# Patient Record
Sex: MALE | Race: OTHER | HISPANIC OR LATINO
[De-identification: names, ages, dates, MRNs, and addresses within clinical notes are randomized per-mention and may not be internally consistent; named-entity substitution may affect disease eponyms.]

---

## 2017-11-07 ENCOUNTER — RX RENEWAL (OUTPATIENT)
Age: 27
End: 2017-11-07

## 2018-01-26 ENCOUNTER — APPOINTMENT (OUTPATIENT)
Dept: NEUROLOGY | Facility: CLINIC | Age: 28
End: 2018-01-26
Payer: COMMERCIAL

## 2018-01-26 VITALS
SYSTOLIC BLOOD PRESSURE: 142 MMHG | HEART RATE: 75 BPM | BODY MASS INDEX: 24.38 KG/M2 | WEIGHT: 190 LBS | HEIGHT: 74 IN | DIASTOLIC BLOOD PRESSURE: 88 MMHG

## 2018-01-26 PROCEDURE — 99214 OFFICE O/P EST MOD 30 MIN: CPT

## 2018-10-17 ENCOUNTER — RX RENEWAL (OUTPATIENT)
Age: 28
End: 2018-10-17

## 2019-01-25 ENCOUNTER — APPOINTMENT (OUTPATIENT)
Dept: NEUROLOGY | Facility: CLINIC | Age: 29
End: 2019-01-25
Payer: COMMERCIAL

## 2019-01-25 VITALS
WEIGHT: 183 LBS | HEIGHT: 74 IN | SYSTOLIC BLOOD PRESSURE: 122 MMHG | DIASTOLIC BLOOD PRESSURE: 75 MMHG | HEART RATE: 74 BPM | BODY MASS INDEX: 23.49 KG/M2

## 2019-01-25 PROCEDURE — 99214 OFFICE O/P EST MOD 30 MIN: CPT

## 2019-01-25 NOTE — DISCUSSION/SUMMARY
[FreeTextEntry1] : Impression - \par idiopathic generalized epilepsy\par \par Plan - Continue ZNS at 200mg bid\par - f/u 6 months\par - Talked about possibly lowering AEDs if Seizure free at least two years and would get AEEG first; if active would not lower.\par \par We discussed his seizure disorder in detail and his overall prognosis.  It was explained that IGE is very treatable and we reviewed the ARDs of ZNS in detail.  He was also told to stay well hydrated.\par \par The patient was seen for a total of 25 min face to face with >50% in discussion and counseling. [Well-controlled] : well-controlled [Generalized] : generalized  [Idiopathic] : idiopathic

## 2019-01-25 NOTE — PHYSICAL EXAM
[General Appearance - Alert] : alert [General Appearance - In No Acute Distress] : in no acute distress [Oriented To Time, Place, And Person] : oriented to person, place, and time [Impaired Insight] : insight and judgment were intact [Affect] : the affect was normal [Cranial Nerves Optic (II)] : visual acuity intact bilaterally,  visual fields full to confrontation, pupils equal round and reactive to light [Cranial Nerves Oculomotor (III)] : extraocular motion intact [Cranial Nerves Trigeminal (V)] : facial sensation intact symmetrically [Cranial Nerves Facial (VII)] : face symmetrical [Cranial Nerves Vestibulocochlear (VIII)] : hearing was intact bilaterally [Cranial Nerves Glossopharyngeal (IX)] : tongue and palate midline [Cranial Nerves Accessory (XI - Cranial And Spinal)] : head turning and shoulder shrug symmetric [Cranial Nerves Hypoglossal (XII)] : there was no tongue deviation with protrusion [Motor Strength] : muscle strength was normal in all four extremities [No Muscle Atrophy] : normal bulk in all four extremities [Sensation Tactile Decrease] : light touch was intact [Sensation Pain / Temperature Decrease] : pain and temperature was intact [Sensation Vibration Decrease] : vibration was intact [Proprioception] : proprioception was intact [Balance] : balance was intact [Past-pointing] : there was no past-pointing [Tremor] : no tremor present [2+] : Ankle jerk left 2+ [Plantar Reflex Right Only] : normal on the right [Plantar Reflex Left Only] : normal on the left [Sclera] : the sclera and conjunctiva were normal [PERRL With Normal Accommodation] : pupils were equal in size, round, reactive to light, with normal accommodation [Extraocular Movements] : extraocular movements were intact [Full Pulse] : the pedal pulses are present [Edema] : there was no peripheral edema [Abnormal Walk] : normal gait [Nail Clubbing] : no clubbing  or cyanosis of the fingernails [Musculoskeletal - Swelling] : no joint swelling seen [Motor Tone] : muscle strength and tone were normal [Skin Color & Pigmentation] : normal skin color and pigmentation [Skin Turgor] : normal skin turgor [] : no rash

## 2019-01-25 NOTE — HISTORY OF PRESENT ILLNESS
[Right Handed] : right handed [Sleep Deprivation] : sleep deprivation [Postictal Confusion and Lethargy] : postictal confusion and lethargy [Grand Mal Status Epilepticus] : no [] : yes [Family History of Seizures] : family history of seizures [ Complications] : ~T no  complications [Head Trauma] : no head trauma [Febrile Seizures] : no febrile seizures [Meningitis or Encephalitis] : no meningitis or encephalitis [Developmental Delay] : no developmental delay [Stroke] : no stroke  [Oxcarbazepine (Trileptal)] : oxcarbazepine (Trileptal) [Zonisamide (Zonegran)] : zonisamide (Zonegran) [FreeTextEntry1] : 12 years of age [FreeTextEntry2] : GTC [FreeTextEntry8] : lack of food [de-identified] : Jan 2014 [de-identified] : bumps and bruises [de-identified] : mother - in teenage years to 20's (not on any meds now)

## 2019-07-30 ENCOUNTER — APPOINTMENT (OUTPATIENT)
Dept: NEUROLOGY | Facility: CLINIC | Age: 29
End: 2019-07-30
Payer: COMMERCIAL

## 2019-07-30 VITALS
BODY MASS INDEX: 24.26 KG/M2 | DIASTOLIC BLOOD PRESSURE: 76 MMHG | WEIGHT: 189 LBS | HEART RATE: 70 BPM | SYSTOLIC BLOOD PRESSURE: 129 MMHG | HEIGHT: 74 IN

## 2019-07-30 PROCEDURE — 99214 OFFICE O/P EST MOD 30 MIN: CPT

## 2019-07-30 NOTE — DISCUSSION/SUMMARY
[FreeTextEntry1] : Impression - \par idiopathic generalized epilepsy\par \par Plan - Continue ZNS at 200mg bid\par - f/u 6 months\par - Talked about possibly lowering AEDs if Seizure free at least two years and would get AEEG first; if active would not lower.\par \par We discussed his seizure disorder in detail and his overall prognosis.  It was explained that IGE is very treatable and we reviewed the ARDs of ZNS in detail.  He was also told to stay well hydrated.\par \par The patient was seen for a total of 25 min face to face with >50% in discussion and counseling. [Generalized] : generalized  [Idiopathic] : idiopathic  [Well-controlled] : well-controlled

## 2019-07-30 NOTE — PHYSICAL EXAM
[General Appearance - Alert] : alert [General Appearance - In No Acute Distress] : in no acute distress [Affect] : the affect was normal [Impaired Insight] : insight and judgment were intact [Oriented To Time, Place, And Person] : oriented to person, place, and time [Cranial Nerves Trigeminal (V)] : facial sensation intact symmetrically [Cranial Nerves Facial (VII)] : face symmetrical [Cranial Nerves Oculomotor (III)] : extraocular motion intact [Cranial Nerves Optic (II)] : visual acuity intact bilaterally,  visual fields full to confrontation, pupils equal round and reactive to light [Cranial Nerves Glossopharyngeal (IX)] : tongue and palate midline [Cranial Nerves Vestibulocochlear (VIII)] : hearing was intact bilaterally [Cranial Nerves Accessory (XI - Cranial And Spinal)] : head turning and shoulder shrug symmetric [Cranial Nerves Hypoglossal (XII)] : there was no tongue deviation with protrusion [Motor Strength] : muscle strength was normal in all four extremities [No Muscle Atrophy] : normal bulk in all four extremities [Sensation Tactile Decrease] : light touch was intact [Proprioception] : proprioception was intact [Sensation Vibration Decrease] : vibration was intact [Sensation Pain / Temperature Decrease] : pain and temperature was intact [Balance] : balance was intact [Past-pointing] : there was no past-pointing [Tremor] : no tremor present [2+] : Ankle jerk left 2+ [Plantar Reflex Right Only] : normal on the right [Plantar Reflex Left Only] : normal on the left [Sclera] : the sclera and conjunctiva were normal [Extraocular Movements] : extraocular movements were intact [Full Pulse] : the pedal pulses are present [PERRL With Normal Accommodation] : pupils were equal in size, round, reactive to light, with normal accommodation [Abnormal Walk] : normal gait [Edema] : there was no peripheral edema [Nail Clubbing] : no clubbing  or cyanosis of the fingernails [Motor Tone] : muscle strength and tone were normal [Skin Color & Pigmentation] : normal skin color and pigmentation [Musculoskeletal - Swelling] : no joint swelling seen [] : no rash [Skin Turgor] : normal skin turgor

## 2019-07-30 NOTE — HISTORY OF PRESENT ILLNESS
[Right Handed] : right handed [Postictal Confusion and Lethargy] : postictal confusion and lethargy [Sleep Deprivation] : sleep deprivation [Grand Mal Status Epilepticus] : no [Family History of Seizures] : family history of seizures [] : yes [ Complications] : ~T no  complications [Head Trauma] : no head trauma [Meningitis or Encephalitis] : no meningitis or encephalitis [Febrile Seizures] : no febrile seizures [Developmental Delay] : no developmental delay [Oxcarbazepine (Trileptal)] : oxcarbazepine (Trileptal) [Stroke] : no stroke  [FreeTextEntry2] : GTC [FreeTextEntry1] : 12 years of age [Zonisamide (Zonegran)] : zonisamide (Zonegran) [FreeTextEntry8] : lack of food [de-identified] : bumps and bruises [de-identified] : Jan 2014 [de-identified] : mother - in teenage years to 20's (not on any meds now)

## 2019-07-30 NOTE — REVIEW OF SYSTEMS
How Severe Is Your Skin Lesion?: mild
Has Your Skin Lesion Been Treated?: not been treated
Is This A New Presentation, Or A Follow-Up?: Skin Lesion
[Negative] : Heme/Lymph

## 2019-09-10 ENCOUNTER — RX RENEWAL (OUTPATIENT)
Age: 29
End: 2019-09-10

## 2019-10-18 ENCOUNTER — APPOINTMENT (OUTPATIENT)
Dept: NEUROLOGY | Facility: CLINIC | Age: 29
End: 2019-10-18
Payer: COMMERCIAL

## 2019-10-18 PROCEDURE — 95816 EEG AWAKE AND DROWSY: CPT

## 2019-10-19 PROCEDURE — 95953: CPT

## 2019-10-20 PROCEDURE — 95953: CPT

## 2020-01-28 ENCOUNTER — APPOINTMENT (OUTPATIENT)
Dept: NEUROLOGY | Facility: CLINIC | Age: 30
End: 2020-01-28
Payer: COMMERCIAL

## 2020-01-28 VITALS
WEIGHT: 187 LBS | DIASTOLIC BLOOD PRESSURE: 64 MMHG | HEIGHT: 73 IN | SYSTOLIC BLOOD PRESSURE: 107 MMHG | HEART RATE: 79 BPM | BODY MASS INDEX: 24.78 KG/M2

## 2020-01-28 PROCEDURE — 99214 OFFICE O/P EST MOD 30 MIN: CPT

## 2020-01-28 NOTE — DISCUSSION/SUMMARY
[Well-controlled] : well-controlled [FreeTextEntry1] : Impression - \par idiopathic generalized epilepsy\par \par Plan - Continue ZNS at 200mg bid\par - f/u 6 months\par - will check level today of ZNS\par - Talked about possibly lowering AEDs \par \par We discussed his seizure disorder in detail and his overall prognosis.  It was explained that IGE is very treatable and we reviewed the ARDs of ZNS in detail.  He was also told to stay well hydrated.\par Had AEEG done in 2019 with gen spike and wave and no seizure activity.  Told him that should not lower off AEDs.  However, patient would like to lower to 100/200 of ZNS.  We will check level today and it on the lower side, would not lower.  If a bit higher, we could lower ZNS but patient understands risk of seizure and that should not drive while lowering or for several months after.\par \par The patient was seen for a total of 25 min face to face with >50% in discussion and counseling. [Generalized] : generalized  [Idiopathic] : idiopathic

## 2020-01-28 NOTE — HISTORY OF PRESENT ILLNESS
[Right Handed] : right handed [Sleep Deprivation] : sleep deprivation [Postictal Confusion and Lethargy] : postictal confusion and lethargy [Grand Mal Status Epilepticus] : no [] : yes [ Complications] : ~T no  complications [Family History of Seizures] : family history of seizures [Head Trauma] : no head trauma [Febrile Seizures] : no febrile seizures [Meningitis or Encephalitis] : no meningitis or encephalitis [Developmental Delay] : no developmental delay [Oxcarbazepine (Trileptal)] : oxcarbazepine (Trileptal) [Stroke] : no stroke  [Zonisamide (Zonegran)] : zonisamide (Zonegran) [FreeTextEntry1] : 12 years of age [FreeTextEntry2] : GTC [de-identified] : bumps and bruises [de-identified] : Jan 2014 [FreeTextEntry8] : lack of food [de-identified] : mother - in teenage years to 20's (not on any meds now)

## 2020-01-28 NOTE — PHYSICAL EXAM
[General Appearance - Alert] : alert [General Appearance - In No Acute Distress] : in no acute distress [Oriented To Time, Place, And Person] : oriented to person, place, and time [Impaired Insight] : insight and judgment were intact [Affect] : the affect was normal [Cranial Nerves Optic (II)] : visual acuity intact bilaterally,  visual fields full to confrontation, pupils equal round and reactive to light [Cranial Nerves Oculomotor (III)] : extraocular motion intact [Cranial Nerves Facial (VII)] : face symmetrical [Cranial Nerves Vestibulocochlear (VIII)] : hearing was intact bilaterally [Cranial Nerves Trigeminal (V)] : facial sensation intact symmetrically [Cranial Nerves Accessory (XI - Cranial And Spinal)] : head turning and shoulder shrug symmetric [Cranial Nerves Glossopharyngeal (IX)] : tongue and palate midline [Cranial Nerves Hypoglossal (XII)] : there was no tongue deviation with protrusion [Motor Strength] : muscle strength was normal in all four extremities [No Muscle Atrophy] : normal bulk in all four extremities [Sensation Pain / Temperature Decrease] : pain and temperature was intact [Sensation Tactile Decrease] : light touch was intact [Sensation Vibration Decrease] : vibration was intact [Proprioception] : proprioception was intact [Tremor] : no tremor present [Balance] : balance was intact [Past-pointing] : there was no past-pointing [Plantar Reflex Right Only] : normal on the right [2+] : Ankle jerk right 2+ [Plantar Reflex Left Only] : normal on the left [Sclera] : the sclera and conjunctiva were normal [PERRL With Normal Accommodation] : pupils were equal in size, round, reactive to light, with normal accommodation [Extraocular Movements] : extraocular movements were intact [Full Pulse] : the pedal pulses are present [Edema] : there was no peripheral edema [Nail Clubbing] : no clubbing  or cyanosis of the fingernails [Abnormal Walk] : normal gait [Motor Tone] : muscle strength and tone were normal [Musculoskeletal - Swelling] : no joint swelling seen [Skin Color & Pigmentation] : normal skin color and pigmentation [Skin Turgor] : normal skin turgor [] : no rash

## 2020-07-21 ENCOUNTER — APPOINTMENT (OUTPATIENT)
Dept: NEUROLOGY | Facility: CLINIC | Age: 30
End: 2020-07-21
Payer: COMMERCIAL

## 2020-07-21 ENCOUNTER — TRANSCRIPTION ENCOUNTER (OUTPATIENT)
Age: 30
End: 2020-07-21

## 2020-07-21 PROCEDURE — 99213 OFFICE O/P EST LOW 20 MIN: CPT | Mod: 95

## 2020-07-21 NOTE — HISTORY OF PRESENT ILLNESS
[Right Handed] : right handed [Sleep Deprivation] : sleep deprivation [] : yes [Postictal Confusion and Lethargy] : postictal confusion and lethargy [Family History of Seizures] : family history of seizures [Oxcarbazepine (Trileptal)] : oxcarbazepine (Trileptal) [Zonisamide (Zonegran)] : zonisamide (Zonegran) [Home] : at home, [unfilled] , at the time of the visit. [Other Location: e.g. Home (Enter Location, City,State)___] : at [unfilled] [Verbal consent obtained from patient] : the patient, [unfilled] [Grand Mal Status Epilepticus] : no [ Complications] : ~T no  complications [Head Trauma] : no head trauma [Febrile Seizures] : no febrile seizures [Meningitis or Encephalitis] : no meningitis or encephalitis [Developmental Delay] : no developmental delay [Stroke] : no stroke  [FreeTextEntry1] : 12 years of age [FreeTextEntry2] : GTC [de-identified] : Jan 2014 [de-identified] : bumps and bruises [de-identified] : mother - in teenage years to 20's (not on any meds now) [FreeTextEntry8] : lack of food

## 2020-07-21 NOTE — PHYSICAL EXAM
[General Appearance - Alert] : alert [Oriented To Time, Place, And Person] : oriented to person, place, and time [General Appearance - In No Acute Distress] : in no acute distress [Impaired Insight] : insight and judgment were intact [Cranial Nerves Optic (II)] : visual acuity intact bilaterally,  visual fields full to confrontation, pupils equal round and reactive to light [Affect] : the affect was normal [Cranial Nerves Trigeminal (V)] : facial sensation intact symmetrically [Cranial Nerves Oculomotor (III)] : extraocular motion intact [Cranial Nerves Facial (VII)] : face symmetrical [Cranial Nerves Glossopharyngeal (IX)] : tongue and palate midline [Cranial Nerves Vestibulocochlear (VIII)] : hearing was intact bilaterally [Cranial Nerves Accessory (XI - Cranial And Spinal)] : head turning and shoulder shrug symmetric [Cranial Nerves Hypoglossal (XII)] : there was no tongue deviation with protrusion [No Muscle Atrophy] : normal bulk in all four extremities [Motor Strength] : muscle strength was normal in all four extremities [Sensation Tactile Decrease] : light touch was intact [Sensation Pain / Temperature Decrease] : pain and temperature was intact [Sensation Vibration Decrease] : vibration was intact [Balance] : balance was intact [Proprioception] : proprioception was intact [2+] : Ankle jerk right 2+ [PERRL With Normal Accommodation] : pupils were equal in size, round, reactive to light, with normal accommodation [Sclera] : the sclera and conjunctiva were normal [Extraocular Movements] : extraocular movements were intact [Nail Clubbing] : no clubbing  or cyanosis of the fingernails [Abnormal Walk] : normal gait [Skin Color & Pigmentation] : normal skin color and pigmentation [Motor Tone] : muscle strength and tone were normal [Musculoskeletal - Swelling] : no joint swelling seen [Skin Turgor] : normal skin turgor [] : no rash [Past-pointing] : there was no past-pointing [Tremor] : no tremor present [Plantar Reflex Left Only] : normal on the left [Plantar Reflex Right Only] : normal on the right

## 2020-07-21 NOTE — DISCUSSION/SUMMARY
[Well-controlled] : well-controlled [Idiopathic] : idiopathic  [Generalized] : generalized  [FreeTextEntry1] : Impression - \par idiopathic generalized epilepsy\par \par Plan - Continue ZNS at 200mg bid\par - f/u 6 months\par - will check level next visit\par - Talked about possibly lowering AEDs \par \par We discussed his seizure disorder in detail and his overall prognosis.  It was explained that IGE is very treatable and we reviewed the ARDs of ZNS in detail.  He was also told to stay well hydrated.\par Had AEEG done in 2019 with gen spike and wave and no seizure activity.  Told him that should not lower off AEDs. \par \par We spoke at last visit that he would like to lower to 100/200 of ZNS.  At this point, he does not want to do so.\par \par The patient was seen for a total of 15 min face to face with >50% in discussion and counseling.

## 2021-03-19 ENCOUNTER — APPOINTMENT (OUTPATIENT)
Dept: NEUROLOGY | Facility: CLINIC | Age: 31
End: 2021-03-19
Payer: COMMERCIAL

## 2021-03-19 VITALS — TEMPERATURE: 97.3 F

## 2021-03-19 VITALS
BODY MASS INDEX: 24.92 KG/M2 | WEIGHT: 188 LBS | SYSTOLIC BLOOD PRESSURE: 135 MMHG | HEART RATE: 84 BPM | HEIGHT: 73 IN | DIASTOLIC BLOOD PRESSURE: 85 MMHG

## 2021-03-19 PROCEDURE — 99214 OFFICE O/P EST MOD 30 MIN: CPT

## 2021-03-19 PROCEDURE — 99072 ADDL SUPL MATRL&STAF TM PHE: CPT

## 2021-03-19 NOTE — HISTORY OF PRESENT ILLNESS
[Right Handed] : right handed [Sleep Deprivation] : sleep deprivation [Postictal Confusion and Lethargy] : postictal confusion and lethargy [Grand Mal Status Epilepticus] : no [] : yes [Family History of Seizures] : family history of seizures [ Complications] : ~T no  complications [Head Trauma] : no head trauma [Febrile Seizures] : no febrile seizures [Meningitis or Encephalitis] : no meningitis or encephalitis [Developmental Delay] : no developmental delay [Stroke] : no stroke  [Oxcarbazepine (Trileptal)] : oxcarbazepine (Trileptal) [Zonisamide (Zonegran)] : zonisamide (Zonegran) [FreeTextEntry1] : 12 years of age [FreeTextEntry2] : GTC [FreeTextEntry8] : lack of food [de-identified] : Jan 2014 [de-identified] : bumps and bruises [de-identified] : mother - in teenage years to 20's (not on any meds now)

## 2021-03-19 NOTE — PHYSICAL EXAM
[General Appearance - Alert] : alert [General Appearance - In No Acute Distress] : in no acute distress [Oriented To Time, Place, And Person] : oriented to person, place, and time [Impaired Insight] : insight and judgment were intact [Affect] : the affect was normal [Cranial Nerves Optic (II)] : visual acuity intact bilaterally,  visual fields full to confrontation, pupils equal round and reactive to light [Cranial Nerves Oculomotor (III)] : extraocular motion intact [Cranial Nerves Trigeminal (V)] : facial sensation intact symmetrically [Cranial Nerves Facial (VII)] : face symmetrical [Cranial Nerves Vestibulocochlear (VIII)] : hearing was intact bilaterally [Cranial Nerves Glossopharyngeal (IX)] : tongue and palate midline [Cranial Nerves Accessory (XI - Cranial And Spinal)] : head turning and shoulder shrug symmetric [Cranial Nerves Hypoglossal (XII)] : there was no tongue deviation with protrusion [Motor Strength] : muscle strength was normal in all four extremities [No Muscle Atrophy] : normal bulk in all four extremities [Sensation Tactile Decrease] : light touch was intact [Sensation Pain / Temperature Decrease] : pain and temperature was intact [Sensation Vibration Decrease] : vibration was intact [Proprioception] : proprioception was intact [Balance] : balance was intact [Past-pointing] : there was no past-pointing [Tremor] : no tremor present [2+] : Ankle jerk left 2+ [Plantar Reflex Right Only] : normal on the right [Plantar Reflex Left Only] : normal on the left [Sclera] : the sclera and conjunctiva were normal [PERRL With Normal Accommodation] : pupils were equal in size, round, reactive to light, with normal accommodation [Extraocular Movements] : extraocular movements were intact [Abnormal Walk] : normal gait [Nail Clubbing] : no clubbing  or cyanosis of the fingernails [Musculoskeletal - Swelling] : no joint swelling seen [Motor Tone] : muscle strength and tone were normal [Skin Color & Pigmentation] : normal skin color and pigmentation [Skin Turgor] : normal skin turgor [] : no rash

## 2021-03-19 NOTE — DISCUSSION/SUMMARY
[FreeTextEntry1] : Impression - \par idiopathic generalized epilepsy\par \par Plan - Continue ZNS at 200mg bid\par - f/u 6 months\par - will check level\par - Talked about possibly lowering AEDs \par - Mentioned possible stones in urine, importance of hydration discussed again today.  He mentions that has no pain now or evidence of stones.  Told him if returns in any way to see urology.  If does have stones, would continue talk of lowering ZNS.\par \par We discussed his seizure disorder in detail and his overall prognosis.  It was explained that IGE is very treatable and we reviewed the ARDs of ZNS in detail.  He was also told to stay well hydrated.\par Had AEEG done in 2019 with gen spike and wave and no seizure activity.  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 [Well-controlled] : well-controlled [Generalized] : generalized  [Idiopathic] : idiopathic

## 2021-12-23 ENCOUNTER — NON-APPOINTMENT (OUTPATIENT)
Age: 31
End: 2021-12-23

## 2022-01-28 ENCOUNTER — APPOINTMENT (OUTPATIENT)
Dept: NEUROLOGY | Facility: CLINIC | Age: 32
End: 2022-01-28
Payer: COMMERCIAL

## 2022-01-28 VITALS
DIASTOLIC BLOOD PRESSURE: 83 MMHG | BODY MASS INDEX: 25.84 KG/M2 | OXYGEN SATURATION: 98 % | TEMPERATURE: 97.9 F | WEIGHT: 195 LBS | SYSTOLIC BLOOD PRESSURE: 144 MMHG | HEART RATE: 88 BPM | HEIGHT: 73 IN

## 2022-01-28 PROCEDURE — 99214 OFFICE O/P EST MOD 30 MIN: CPT

## 2022-01-28 NOTE — DISCUSSION/SUMMARY
[FreeTextEntry1] : Impression - \par idiopathic generalized epilepsy\par \par Plan - Continue ZNS at 200mg bid\par - f/u 6 months\par - will check level today, cbc, cmp\par - Talked about possibly lowering AEDs - no change at this time \par - Mentioned possible stones in urine in past, importance of hydration discussed again today.  He mentions that has no pain now or evidence of stones.  Told him if returns in any way to see urology.  If does have stones, would continue talk of lowering ZNS.\par \par We discussed his seizure disorder in detail and his overall prognosis.  It was explained that IGE is very treatable and we reviewed the ARDs of ZNS in detail.  He was also told to stay well hydrated.\par Had AEEG done in 2019 with gen spike and wave and no seizure activity.  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 [Well-controlled] : well-controlled [Generalized] : generalized  [Idiopathic] : idiopathic

## 2022-01-28 NOTE — HISTORY OF PRESENT ILLNESS
[Right Handed] : right handed [Sleep Deprivation] : sleep deprivation [Postictal Confusion and Lethargy] : postictal confusion and lethargy [Grand Mal Status Epilepticus] : no [] : yes [Family History of Seizures] : family history of seizures [ Complications] : ~T no  complications [Head Trauma] : no head trauma [Febrile Seizures] : no febrile seizures [Meningitis or Encephalitis] : no meningitis or encephalitis [Developmental Delay] : no developmental delay [Stroke] : no stroke  [Oxcarbazepine (Trileptal)] : oxcarbazepine (Trileptal) [Zonisamide (Zonegran)] : zonisamide (Zonegran) [FreeTextEntry1] : 12 years of age [FreeTextEntry2] : GTC [FreeTextEntry8] : lack of food [de-identified] : Jan 2014 [de-identified] : bumps and bruises [de-identified] : mother - in teenage years to 20's (not on any meds now)

## 2022-08-08 ENCOUNTER — APPOINTMENT (OUTPATIENT)
Dept: NEUROLOGY | Facility: CLINIC | Age: 32
End: 2022-08-08

## 2022-08-16 ENCOUNTER — APPOINTMENT (OUTPATIENT)
Dept: NEUROLOGY | Facility: CLINIC | Age: 32
End: 2022-08-16

## 2022-11-18 ENCOUNTER — APPOINTMENT (OUTPATIENT)
Dept: NEUROLOGY | Facility: CLINIC | Age: 32
End: 2022-11-18

## 2022-11-18 VITALS
RESPIRATION RATE: 16 BRPM | HEIGHT: 73 IN | WEIGHT: 195 LBS | BODY MASS INDEX: 25.84 KG/M2 | DIASTOLIC BLOOD PRESSURE: 98 MMHG | SYSTOLIC BLOOD PRESSURE: 152 MMHG | HEART RATE: 71 BPM

## 2022-11-18 PROCEDURE — 99215 OFFICE O/P EST HI 40 MIN: CPT

## 2022-11-18 NOTE — DISCUSSION/SUMMARY
[FreeTextEntry1] : Impression - \par idiopathic generalized epilepsy\par \par Plan - Continue ZNS at 200mg bid\par - Check ZNS level - long discussion about possibly increasing at this time but he would prefer to make no changes as of yet. If level on lower side, would recommend 300QHS and 200 in the morning.\par - f/u 6 months\par - will check level today, cbc, cmp\par - Mentioned possible stones in urine in past, importance of hydration discussed again today.  He mentions that has no pain now or evidence of stones.  Told him if returns in any way to see urology.  If does have stones, would continue talk of lowering ZNS or changing meds.\par \par We discussed his seizure disorder in detail and his overall prognosis.  It was explained that IGE is very treatable and we reviewed the ARDs of ZNS in detail.  He was also told to stay well hydrated.\par Had AEEG done in 2019 with gen spike and wave and no seizure activity.  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 Long discussion on driving; told him no driving for one year seizure free (start Nov 2023 at this time). Legal, health, safety and financial risks discussed.\par \par Total time 42 minutes. [Well-controlled] : well-controlled [Generalized] : generalized  [Idiopathic] : idiopathic

## 2022-11-18 NOTE — HISTORY OF PRESENT ILLNESS
[Right Handed] : right handed [Sleep Deprivation] : sleep deprivation [Postictal Confusion and Lethargy] : postictal confusion and lethargy [Grand Mal Status Epilepticus] : no [] : yes [Family History of Seizures] : family history of seizures [ Complications] : ~T no  complications [Head Trauma] : no head trauma [Febrile Seizures] : no febrile seizures [Meningitis or Encephalitis] : no meningitis or encephalitis [Developmental Delay] : no developmental delay [Stroke] : no stroke  [Oxcarbazepine (Trileptal)] : oxcarbazepine (Trileptal) [Zonisamide (Zonegran)] : zonisamide (Zonegran) [FreeTextEntry1] : 12 years of age [FreeTextEntry2] : GTC [FreeTextEntry8] : lack of food [de-identified] : Jan 2014 [de-identified] : bumps and bruises [de-identified] : mother - in teenage years to 20's (not on any meds now)

## 2022-12-03 LAB
ALBUMIN SERPL ELPH-MCNC: 4.6 G/DL
ALP BLD-CCNC: 86 U/L
ALT SERPL-CCNC: 15 U/L
ANION GAP SERPL CALC-SCNC: 12 MMOL/L
AST SERPL-CCNC: 14 U/L
BASOPHILS # BLD AUTO: 0.04 K/UL
BASOPHILS NFR BLD AUTO: 0.7 %
BILIRUB SERPL-MCNC: 0.5 MG/DL
BUN SERPL-MCNC: 16 MG/DL
CALCIUM SERPL-MCNC: 9.6 MG/DL
CHLORIDE SERPL-SCNC: 105 MMOL/L
CO2 SERPL-SCNC: 25 MMOL/L
CREAT SERPL-MCNC: 1.22 MG/DL
EGFR: 81 ML/MIN/1.73M2
EOSINOPHIL # BLD AUTO: 0.23 K/UL
EOSINOPHIL NFR BLD AUTO: 4.2 %
GLUCOSE SERPL-MCNC: 92 MG/DL
HCT VFR BLD CALC: 45 %
HGB BLD-MCNC: 15.1 G/DL
IMM GRANULOCYTES NFR BLD AUTO: 0.4 %
LYMPHOCYTES # BLD AUTO: 1.47 K/UL
LYMPHOCYTES NFR BLD AUTO: 26.7 %
MAN DIFF?: NORMAL
MCHC RBC-ENTMCNC: 31.9 PG
MCHC RBC-ENTMCNC: 33.6 GM/DL
MCV RBC AUTO: 94.9 FL
MONOCYTES # BLD AUTO: 0.42 K/UL
MONOCYTES NFR BLD AUTO: 7.6 %
NEUTROPHILS # BLD AUTO: 3.32 K/UL
NEUTROPHILS NFR BLD AUTO: 60.4 %
PLATELET # BLD AUTO: 240 K/UL
POTASSIUM SERPL-SCNC: 4.1 MMOL/L
PROT SERPL-MCNC: 6.9 G/DL
RBC # BLD: 4.74 M/UL
RBC # FLD: 13.2 %
SODIUM SERPL-SCNC: 142 MMOL/L
WBC # FLD AUTO: 5.5 K/UL

## 2022-12-06 LAB — ZONISAMIDE SERPL-MCNC: 20.2 UG/ML

## 2023-05-05 ENCOUNTER — APPOINTMENT (OUTPATIENT)
Dept: NEUROLOGY | Facility: CLINIC | Age: 33
End: 2023-05-05
Payer: COMMERCIAL

## 2023-05-05 VITALS
DIASTOLIC BLOOD PRESSURE: 91 MMHG | WEIGHT: 196 LBS | SYSTOLIC BLOOD PRESSURE: 138 MMHG | HEIGHT: 73 IN | BODY MASS INDEX: 25.98 KG/M2 | HEART RATE: 68 BPM

## 2023-05-05 PROCEDURE — 99214 OFFICE O/P EST MOD 30 MIN: CPT

## 2023-05-05 NOTE — HISTORY OF PRESENT ILLNESS
[Right Handed] : right handed [Sleep Deprivation] : sleep deprivation [Postictal Confusion and Lethargy] : postictal confusion and lethargy [] : yes [Family History of Seizures] : family history of seizures [Oxcarbazepine (Trileptal)] : oxcarbazepine (Trileptal) [Zonisamide (Zonegran)] : zonisamide (Zonegran) [Grand Mal Status Epilepticus] : no [ Complications] : ~T no  complications [Head Trauma] : no head trauma [Febrile Seizures] : no febrile seizures [Meningitis or Encephalitis] : no meningitis or encephalitis [Developmental Delay] : no developmental delay [Stroke] : no stroke  [FreeTextEntry1] : 12 years of age [FreeTextEntry2] : GTC [FreeTextEntry8] : lack of food [de-identified] : Jan 2014 [de-identified] : bumps and bruises [de-identified] : mother - in teenage years to 20's (not on any meds now)

## 2023-05-05 NOTE — DISCUSSION/SUMMARY
[Well-controlled] : well-controlled [Generalized] : generalized  [Idiopathic] : idiopathic  [FreeTextEntry1] : Impression - \par idiopathic generalized epilepsy\par \par Plan - Continue ZNS at 200mg bid\par - Checked ZNS level 20.2 (10-40) end of 2022 with normal CBC/CMP. At last visit, long discussion about possibly increasing at this time but he would prefer to make no changes\par - f/u 6 months\par - Mentioned possible stones in urine in past, importance of hydration discussed again today.  He mentions that has no pain now or evidence of stones.  Told him if returns in any way to see urology.  If does have stones, would continue talk of lowering ZNS or changing meds.\par \par We discussed his seizure disorder in detail and his overall prognosis.  It was explained that IGE is very treatable and we reviewed the ARDs of ZNS in detail.  He was also told to stay well hydrated.\par Had AEEG done in 2019 with gen spike and wave and no seizure activity.  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 Long discussion on driving; told him no driving for one year seizure free (start Nov 2023 at this time). Legal, health, safety and financial risks discussed.\par \par Total time 32 minutes.

## 2023-05-05 NOTE — PHYSICAL EXAM
[General Appearance - Alert] : alert [General Appearance - In No Acute Distress] : in no acute distress [Oriented To Time, Place, And Person] : oriented to person, place, and time [Impaired Insight] : insight and judgment were intact [Affect] : the affect was normal [Cranial Nerves Optic (II)] : visual acuity intact bilaterally,  visual fields full to confrontation, pupils equal round and reactive to light [Cranial Nerves Oculomotor (III)] : extraocular motion intact [Cranial Nerves Trigeminal (V)] : facial sensation intact symmetrically [Cranial Nerves Facial (VII)] : face symmetrical [Cranial Nerves Vestibulocochlear (VIII)] : hearing was intact bilaterally [Cranial Nerves Glossopharyngeal (IX)] : tongue and palate midline [Cranial Nerves Accessory (XI - Cranial And Spinal)] : head turning and shoulder shrug symmetric [Cranial Nerves Hypoglossal (XII)] : there was no tongue deviation with protrusion [Motor Strength] : muscle strength was normal in all four extremities [No Muscle Atrophy] : normal bulk in all four extremities [Sensation Tactile Decrease] : light touch was intact [Sensation Pain / Temperature Decrease] : pain and temperature was intact [Sensation Vibration Decrease] : vibration was intact [Proprioception] : proprioception was intact [Balance] : balance was intact [2+] : Ankle jerk left 2+ [Sclera] : the sclera and conjunctiva were normal [PERRL With Normal Accommodation] : pupils were equal in size, round, reactive to light, with normal accommodation [Extraocular Movements] : extraocular movements were intact [Abnormal Walk] : normal gait [Nail Clubbing] : no clubbing  or cyanosis of the fingernails [Musculoskeletal - Swelling] : no joint swelling seen [Motor Tone] : muscle strength and tone were normal [Skin Color & Pigmentation] : normal skin color and pigmentation [Skin Turgor] : normal skin turgor [] : no rash [Past-pointing] : there was no past-pointing [Tremor] : no tremor present [Plantar Reflex Right Only] : normal on the right [Plantar Reflex Left Only] : normal on the left

## 2023-10-10 ENCOUNTER — APPOINTMENT (OUTPATIENT)
Dept: NEUROLOGY | Facility: CLINIC | Age: 33
End: 2023-10-10

## 2023-11-13 ENCOUNTER — APPOINTMENT (OUTPATIENT)
Dept: NEUROLOGY | Facility: CLINIC | Age: 33
End: 2023-11-13
Payer: COMMERCIAL

## 2023-11-13 VITALS
HEART RATE: 83 BPM | DIASTOLIC BLOOD PRESSURE: 88 MMHG | BODY MASS INDEX: 25.98 KG/M2 | SYSTOLIC BLOOD PRESSURE: 134 MMHG | HEIGHT: 73 IN | WEIGHT: 196 LBS

## 2023-11-13 PROCEDURE — 99214 OFFICE O/P EST MOD 30 MIN: CPT

## 2023-11-18 LAB
ALBUMIN SERPL ELPH-MCNC: 4.9 G/DL
ALP BLD-CCNC: 72 U/L
ALT SERPL-CCNC: 20 U/L
ANION GAP SERPL CALC-SCNC: 10 MMOL/L
AST SERPL-CCNC: 18 U/L
BILIRUB SERPL-MCNC: 0.7 MG/DL
BUN SERPL-MCNC: 15 MG/DL
CALCIUM SERPL-MCNC: 9.8 MG/DL
CHLORIDE SERPL-SCNC: 108 MMOL/L
CO2 SERPL-SCNC: 25 MMOL/L
CREAT SERPL-MCNC: 1.11 MG/DL
EGFR: 90 ML/MIN/1.73M2
GLUCOSE SERPL-MCNC: 94 MG/DL
HCT VFR BLD CALC: 44.9 %
HGB BLD-MCNC: 14.9 G/DL
MCHC RBC-ENTMCNC: 31.3 PG
MCHC RBC-ENTMCNC: 33.2 GM/DL
MCV RBC AUTO: 94.3 FL
PLATELET # BLD AUTO: 209 K/UL
POTASSIUM SERPL-SCNC: 4.6 MMOL/L
PROT SERPL-MCNC: 7 G/DL
RBC # BLD: 4.76 M/UL
RBC # FLD: 12.9 %
SODIUM SERPL-SCNC: 143 MMOL/L
WBC # FLD AUTO: 4.98 K/UL

## 2023-11-20 LAB — ZONISAMIDE SERPL-MCNC: 18.2 UG/ML

## 2024-05-17 ENCOUNTER — APPOINTMENT (OUTPATIENT)
Dept: NEUROLOGY | Facility: CLINIC | Age: 34
End: 2024-05-17

## 2024-05-17 ENCOUNTER — NON-APPOINTMENT (OUTPATIENT)
Age: 34
End: 2024-05-17

## 2024-06-27 ENCOUNTER — NON-APPOINTMENT (OUTPATIENT)
Age: 34
End: 2024-06-27

## 2024-06-28 ENCOUNTER — APPOINTMENT (OUTPATIENT)
Dept: NEUROLOGY | Facility: CLINIC | Age: 34
End: 2024-06-28
Payer: COMMERCIAL

## 2024-06-28 VITALS — HEIGHT: 73 IN | BODY MASS INDEX: 25.98 KG/M2 | WEIGHT: 196 LBS

## 2024-06-28 VITALS — SYSTOLIC BLOOD PRESSURE: 140 MMHG | DIASTOLIC BLOOD PRESSURE: 91 MMHG | HEART RATE: 84 BPM

## 2024-06-28 PROCEDURE — 99214 OFFICE O/P EST MOD 30 MIN: CPT

## 2024-06-28 PROCEDURE — G2211 COMPLEX E/M VISIT ADD ON: CPT | Mod: NC

## 2025-04-19 ENCOUNTER — NON-APPOINTMENT (OUTPATIENT)
Age: 35
End: 2025-04-19

## 2025-04-21 ENCOUNTER — APPOINTMENT (OUTPATIENT)
Dept: NEUROLOGY | Facility: CLINIC | Age: 35
End: 2025-04-21
Payer: COMMERCIAL

## 2025-04-21 VITALS
DIASTOLIC BLOOD PRESSURE: 78 MMHG | HEIGHT: 73 IN | HEART RATE: 77 BPM | SYSTOLIC BLOOD PRESSURE: 142 MMHG | BODY MASS INDEX: 26.24 KG/M2 | WEIGHT: 198 LBS

## 2025-04-21 PROCEDURE — G2211 COMPLEX E/M VISIT ADD ON: CPT | Mod: NC

## 2025-04-21 PROCEDURE — 99214 OFFICE O/P EST MOD 30 MIN: CPT
